# Patient Record
Sex: MALE | Race: WHITE
[De-identification: names, ages, dates, MRNs, and addresses within clinical notes are randomized per-mention and may not be internally consistent; named-entity substitution may affect disease eponyms.]

---

## 2018-07-12 ENCOUNTER — HOSPITAL ENCOUNTER (EMERGENCY)
Dept: HOSPITAL 56 - MW.ED | Age: 58
Discharge: HOME | End: 2018-07-12
Payer: OTHER GOVERNMENT

## 2018-07-12 DIAGNOSIS — Z76.0: Primary | ICD-10-CM

## 2018-07-12 NOTE — EDM.PDOC
ED HPI GENERAL MEDICAL PROBLEM





- General


Chief Complaint: Medication Administration


Stated Complaint: ISSUES


Time Seen by Provider: 07/12/18 15:47





- History of Present Illness


INITIAL COMMENTS - FREE TEXT/NARRATIVE: 





HISTORY AND PHYSICAL:





History of present illness:


The patient is a 57-year-old male with multiple medical problems who is here 

visiting his family from [place] and presents with complaints of needing 5 days 

of certain of his medications that he does not have until he can get back home 

to [place]. He has a history of a blood clot from the PICC line, cardiac 

disease and hypertension and does not want to have a sensation in his 

medication therapy. Patient has no systemic complaints such as chest pain 

shortness of breath abdominal pain vomiting or diarrhea. He has been eating and 

drinking normally and has no upper respiratory complaints or fevers. Patient 

and [place] and [facility] 

they denied him as he is not a local patient. He is here for help.





Review of systems: 


As per history of present illness and below otherwise all systems reviewed and 

negative.





Past medical history: 


As per history of present illness and as reviewed below otherwise 

noncontributory.





Surgical history: 


As per history of present illness and as reviewed below otherwise 

noncontributory.





Social history: 


No reported history of drug or alcohol abuse.





Family history: 


As per history of present illness and as reviewed below otherwise 

noncontributory.





Physical exam:


: Well-developed overweight man who is nontoxic and vital signs are reviewed by 

me


HEENT: Atraumatic, normocephalic,  negative for conjunctival pallor or scleral 

icterus, mucous membranes moist, throat clear, neck supple, nontender, trachea 

midline.


Lungs: Clear to auscultation occasional coarse breath sounds but no work of 

breathing stridor or wheezing, breath sounds equal bilaterally, chest nontender.


Heart: S1S2, regular in rhythm no overt murmur


Abdomen: Soft, nondistended, nontender. NABS


Pelvis: Deferred


Genitourinary: Deferred.


Rectal: Deferred.


Extremities: Atraumatic, full range of motion without defects or deficits 

Neurovascular unremarkable.


Neuro: Awake, alert, oriented. Cranial nerves II through XII unremarkable. 

Cerebellum unremarkable. Motor and sensory unremarkable throughout. Exam 

nonfocal.





Diagnostics:


[]





Therapeutics:


[]





Impression: 


Medication refill





Definitive disposition and diagnosis as appropriate pending reevaluation and 

review of above.





ED ROS GENERAL





- Review of Systems


Review Of Systems: ROS reveals no pertinent complaints other than HPI.





ED EXAM, GENERAL





- Physical Exam


Exam: See Below (See dictation)





Departure





- Departure


Time of Disposition: 15:55


Disposition: Home, Self-Care 01


Condition: Good


Clinical Impression: 


 Medication refill








- Discharge Information


Referrals: 


PCP,Not In Area [Primary Care Provider] - 


Additional Instructions: 


The following information is given to patients seen in the emergency department 

who are being discharged to home. This information is to outline your options 

for follow-up care. We provide all patients seen in our emergency department 

with a follow-up referral.





The need for follow-up, as well as the timing and circumstances, are variable 

depending upon the specifics of your emergency department visit.





If you don't have a primary care physician on staff, we will provide you with a 

referral. We always advise you to contact your personal physician following an 

emergency department visit to inform them of the circumstance of the visit and 

for follow-up with them and/or the need for any referrals to a consulting 

specialist.





The emergency department will also refer you to a specialist when appropriate. 

This referral assures that you have the opportunity for followup care with a 

specialist. All of these measure are taken in an effort to provide you with 

optimal care, which includes your followup.





Under all circumstances we always encourage you to contact your private 

physician who remains a resource for coordinating  your care. When calling for 

followup care, please make the office aware that this follow-up is from your 

recent emergency room visit. If for any reason you are refused follow-up, 

please contact the [facility] emergency 

department at [phone] and ask to speak to the emergency department 

charge nurse.





[facility] 


Primary care- Internal Medicine and Family [address] [address]


[phone]








Please take all medications as you have been prescribed previously and follow-

up with your provider when you get back home to [place]. Return to ER as needed 

and as discussed